# Patient Record
Sex: FEMALE | Race: WHITE | URBAN - NONMETROPOLITAN AREA
[De-identification: names, ages, dates, MRNs, and addresses within clinical notes are randomized per-mention and may not be internally consistent; named-entity substitution may affect disease eponyms.]

---

## 2019-01-16 ENCOUNTER — APPOINTMENT (RX ONLY)
Dept: URBAN - NONMETROPOLITAN AREA CLINIC 7 | Facility: CLINIC | Age: 79
Setting detail: DERMATOLOGY
End: 2019-01-16

## 2019-01-16 DIAGNOSIS — L82.1 OTHER SEBORRHEIC KERATOSIS: ICD-10-CM

## 2019-01-16 DIAGNOSIS — D22 MELANOCYTIC NEVI: ICD-10-CM

## 2019-01-16 DIAGNOSIS — L57.0 ACTINIC KERATOSIS: ICD-10-CM

## 2019-01-16 DIAGNOSIS — L81.4 OTHER MELANIN HYPERPIGMENTATION: ICD-10-CM

## 2019-01-16 DIAGNOSIS — B00.1 HERPESVIRAL VESICULAR DERMATITIS: ICD-10-CM

## 2019-01-16 PROBLEM — D22.5 MELANOCYTIC NEVI OF TRUNK: Status: ACTIVE | Noted: 2019-01-16

## 2019-01-16 PROBLEM — I10 ESSENTIAL (PRIMARY) HYPERTENSION: Status: ACTIVE | Noted: 2019-01-16

## 2019-01-16 PROBLEM — H91.90 UNSPECIFIED HEARING LOSS, UNSPECIFIED EAR: Status: ACTIVE | Noted: 2019-01-16

## 2019-01-16 PROBLEM — Z85.828 PERSONAL HISTORY OF OTHER MALIGNANT NEOPLASM OF SKIN: Status: ACTIVE | Noted: 2019-01-16

## 2019-01-16 PROBLEM — E78.5 HYPERLIPIDEMIA, UNSPECIFIED: Status: ACTIVE | Noted: 2019-01-16

## 2019-01-16 PROBLEM — M12.9 ARTHROPATHY, UNSPECIFIED: Status: ACTIVE | Noted: 2019-01-16

## 2019-01-16 PROCEDURE — ? COUNSELING

## 2019-01-16 PROCEDURE — ? LIQUID NITROGEN

## 2019-01-16 PROCEDURE — ? PRESCRIPTION

## 2019-01-16 PROCEDURE — 17004 DESTROY PREMAL LESIONS 15/>: CPT

## 2019-01-16 PROCEDURE — 99214 OFFICE O/P EST MOD 30 MIN: CPT | Mod: 25

## 2019-01-16 RX ORDER — VALACYCLOVIR HYDROCHLORIDE 1 G/1
TABLET, FILM COATED ORAL
Qty: 21 | Refills: 3 | Status: ERX | COMMUNITY
Start: 2019-01-16

## 2019-01-16 RX ORDER — HYDROQUINONE 4 %
CREAM (GRAM) TOPICAL
Qty: 1 | Refills: 2 | Status: ERX | COMMUNITY
Start: 2019-01-16

## 2019-01-16 RX ORDER — NYSTATIN AND TRIAMCINOLONE ACETONIDE 100000; 1 [USP'U]/G; MG/G
CREAM TOPICAL
Qty: 1 | Refills: 1 | Status: ERX | COMMUNITY
Start: 2019-01-16

## 2019-01-16 RX ADMIN — Medication: at 00:00

## 2019-01-16 RX ADMIN — VALACYCLOVIR HYDROCHLORIDE: 1 TABLET, FILM COATED ORAL at 00:00

## 2019-01-16 RX ADMIN — NYSTATIN AND TRIAMCINOLONE ACETONIDE: 100000; 1 CREAM TOPICAL at 00:00

## 2019-01-16 ASSESSMENT — LOCATION DETAILED DESCRIPTION DERM
LOCATION DETAILED: LEFT MID TEMPLE
LOCATION DETAILED: LEFT PROXIMAL DORSAL FOREARM
LOCATION DETAILED: LEFT POSTERIOR SHOULDER
LOCATION DETAILED: RIGHT MEDIAL SUPERIOR CHEST
LOCATION DETAILED: RIGHT ANTERIOR DISTAL UPPER ARM
LOCATION DETAILED: LEFT ELBOW
LOCATION DETAILED: LEFT ANTERIOR DISTAL UPPER ARM
LOCATION DETAILED: UPPER STERNUM
LOCATION DETAILED: RIGHT ANTERIOR SHOULDER
LOCATION DETAILED: LEFT INFERIOR MEDIAL MIDBACK
LOCATION DETAILED: LEFT PROXIMAL POSTERIOR UPPER ARM
LOCATION DETAILED: LEFT INFERIOR CENTRAL MALAR CHEEK
LOCATION DETAILED: LEFT DISTAL POSTERIOR UPPER ARM
LOCATION DETAILED: RIGHT ANTERIOR PROXIMAL THIGH
LOCATION DETAILED: LEFT CENTRAL MALAR CHEEK
LOCATION DETAILED: LEFT MEDIAL SUPERIOR CHEST
LOCATION DETAILED: LEFT ANTERIOR DISTAL THIGH
LOCATION DETAILED: SUPERIOR THORACIC SPINE
LOCATION DETAILED: LEFT MEDIAL MALAR CHEEK
LOCATION DETAILED: RIGHT INFERIOR CENTRAL MALAR CHEEK
LOCATION DETAILED: LEFT DISTAL DORSAL FOREARM

## 2019-01-16 ASSESSMENT — LOCATION SIMPLE DESCRIPTION DERM
LOCATION SIMPLE: UPPER BACK
LOCATION SIMPLE: LEFT FOREARM
LOCATION SIMPLE: CHEST
LOCATION SIMPLE: RIGHT CHEEK
LOCATION SIMPLE: LEFT SHOULDER
LOCATION SIMPLE: RIGHT THIGH
LOCATION SIMPLE: LEFT POSTERIOR UPPER ARM
LOCATION SIMPLE: LEFT LOWER BACK
LOCATION SIMPLE: LEFT TEMPLE
LOCATION SIMPLE: LEFT ELBOW
LOCATION SIMPLE: LEFT THIGH
LOCATION SIMPLE: LEFT CHEEK
LOCATION SIMPLE: LEFT UPPER ARM
LOCATION SIMPLE: RIGHT SHOULDER
LOCATION SIMPLE: RIGHT UPPER ARM

## 2019-01-16 ASSESSMENT — LOCATION ZONE DERM
LOCATION ZONE: FACE
LOCATION ZONE: LEG
LOCATION ZONE: ARM
LOCATION ZONE: TRUNK

## 2019-01-16 NOTE — HPI: INFECTION (HERPES SIMPLEX)
How Severe Is It?: mild
Is This A New Presentation, Or A Follow-Up?: Follow Up Herpes Simplex
Additional History: Needs refill

## 2020-01-27 ENCOUNTER — APPOINTMENT (RX ONLY)
Dept: URBAN - NONMETROPOLITAN AREA CLINIC 7 | Facility: CLINIC | Age: 80
Setting detail: DERMATOLOGY
End: 2020-01-27

## 2020-01-27 DIAGNOSIS — D18.0 HEMANGIOMA: ICD-10-CM

## 2020-01-27 DIAGNOSIS — L81.4 OTHER MELANIN HYPERPIGMENTATION: ICD-10-CM

## 2020-01-27 DIAGNOSIS — Z85.828 PERSONAL HISTORY OF OTHER MALIGNANT NEOPLASM OF SKIN: ICD-10-CM

## 2020-01-27 DIAGNOSIS — L82.1 OTHER SEBORRHEIC KERATOSIS: ICD-10-CM

## 2020-01-27 DIAGNOSIS — L57.0 ACTINIC KERATOSIS: ICD-10-CM

## 2020-01-27 DIAGNOSIS — D22 MELANOCYTIC NEVI: ICD-10-CM

## 2020-01-27 DIAGNOSIS — B00.1 HERPESVIRAL VESICULAR DERMATITIS: ICD-10-CM

## 2020-01-27 PROBLEM — D48.5 NEOPLASM OF UNCERTAIN BEHAVIOR OF SKIN: Status: ACTIVE | Noted: 2020-01-27

## 2020-01-27 PROBLEM — D18.01 HEMANGIOMA OF SKIN AND SUBCUTANEOUS TISSUE: Status: ACTIVE | Noted: 2020-01-27

## 2020-01-27 PROBLEM — D22.5 MELANOCYTIC NEVI OF TRUNK: Status: ACTIVE | Noted: 2020-01-27

## 2020-01-27 PROCEDURE — ? LIQUID NITROGEN

## 2020-01-27 PROCEDURE — ? BIOPSY BY SHAVE METHOD

## 2020-01-27 PROCEDURE — 17000 DESTRUCT PREMALG LESION: CPT | Mod: 59

## 2020-01-27 PROCEDURE — 17003 DESTRUCT PREMALG LES 2-14: CPT

## 2020-01-27 PROCEDURE — 99213 OFFICE O/P EST LOW 20 MIN: CPT | Mod: 25

## 2020-01-27 PROCEDURE — ? COUNSELING

## 2020-01-27 PROCEDURE — 11102 TANGNTL BX SKIN SINGLE LES: CPT

## 2020-01-27 PROCEDURE — ? PRESCRIPTION

## 2020-01-27 ASSESSMENT — LOCATION SIMPLE DESCRIPTION DERM
LOCATION SIMPLE: NOSE
LOCATION SIMPLE: LEFT UPPER BACK
LOCATION SIMPLE: ABDOMEN
LOCATION SIMPLE: RIGHT BREAST
LOCATION SIMPLE: LEFT CHEEK
LOCATION SIMPLE: RIGHT LOWER BACK

## 2020-01-27 ASSESSMENT — LOCATION DETAILED DESCRIPTION DERM
LOCATION DETAILED: LEFT SUPERIOR MEDIAL MALAR CHEEK
LOCATION DETAILED: EPIGASTRIC SKIN
LOCATION DETAILED: RIGHT MEDIAL BREAST 2-3:00 REGION
LOCATION DETAILED: NASAL ROOT
LOCATION DETAILED: RIGHT SUPERIOR MEDIAL MIDBACK
LOCATION DETAILED: LEFT MID-UPPER BACK

## 2020-01-27 ASSESSMENT — LOCATION ZONE DERM
LOCATION ZONE: TRUNK
LOCATION ZONE: FACE
LOCATION ZONE: NOSE

## 2020-01-27 NOTE — PROCEDURE: BIOPSY BY SHAVE METHOD
Wound Care: Petrolatum
Depth Of Biopsy: dermis
Hide Additional Size Dimension?: No
Notification Instructions: Patient will be notified of biopsy results. However, patient instructed to call the office if not contacted within 2 weeks.
X Size Of Lesion In Cm: 2.5
Electrodesiccation Text: The wound bed was treated with electrodesiccation after the biopsy was performed.
Additional Anesthesia Volume In Cc (Will Not Render If 0): 0
Post-Care Instructions: I reviewed with the patient in detail post-care instructions. Patient is to keep the biopsy site dry overnight, and then apply bacitracin twice daily until healed. Patient may apply hydrogen peroxide soaks to remove any crusting.
Cryotherapy Text: The wound bed was treated with cryotherapy after the biopsy was performed.
Lab Facility: [id number]
Anesthesia Volume In Cc (Will Not Render If 0): 0.5
Type Of Destruction Used: Curettage
Consent: Written consent was obtained and risks were reviewed including but not limited to scarring, infection, bleeding, scabbing, incomplete removal, nerve damage and allergy to anesthesia.
Electrodesiccation And Curettage Text: The wound bed was treated with electrodesiccation and curettage after the biopsy was performed.
Biopsy Type: H and E
Hemostasis: Drysol
Silver Nitrate Text: The wound bed was treated with silver nitrate after the biopsy was performed.
Biopsy Method: Dermablade
Detail Level: Detailed
Dressing: bandage
Was A Bandage Applied: Yes
Size Of Lesion In Cm: 2
Anesthesia Type: 1% lidocaine with epinephrine
Curettage Text: The wound bed was treated with curettage after the biopsy was performed.
Billing Type: Third-Party Bill
Lab: [id number]

## 2020-01-27 NOTE — PROCEDURE: LIQUID NITROGEN
Number Of Freeze-Thaw Cycles: 2 freeze-thaw cycles
Render Post-Care Instructions In Note?: yes
Render Note In Bullet Format When Appropriate: No
Consent: The patient's consent was obtained including but not limited to risks of crusting, scabbing, blistering, scarring, darker or lighter pigmentary change, recurrence, incomplete removal and infection.
Detail Level: Detailed
Duration Of Freeze Thaw-Cycle (Seconds): 0
Post-Care Instructions: I reviewed with the patient in detail post-care instructions. Patient is to wear sunprotection, and avoid picking at any of the treated lesions. Pt may apply Vaseline to crusted or scabbing areas.

## 2020-03-02 ENCOUNTER — APPOINTMENT (RX ONLY)
Dept: URBAN - NONMETROPOLITAN AREA CLINIC 7 | Facility: CLINIC | Age: 80
Setting detail: DERMATOLOGY
End: 2020-03-02

## 2020-03-02 DIAGNOSIS — L57.0 ACTINIC KERATOSIS: ICD-10-CM

## 2020-03-02 PROCEDURE — 17000 DESTRUCT PREMALG LESION: CPT

## 2020-03-02 PROCEDURE — ? PRESCRIPTION

## 2020-03-02 PROCEDURE — ? LIQUID NITROGEN

## 2020-03-02 RX ORDER — MUPIROCIN 20 MG/G
INCH OINTMENT TOPICAL QD
Qty: 1 | Refills: 0 | Status: ERX | COMMUNITY
Start: 2020-03-02

## 2020-03-02 RX ADMIN — MUPIROCIN INCH: 20 OINTMENT TOPICAL at 00:00

## 2020-03-02 ASSESSMENT — LOCATION ZONE DERM: LOCATION ZONE: ARM

## 2020-03-02 ASSESSMENT — LOCATION DETAILED DESCRIPTION DERM: LOCATION DETAILED: LEFT DISTAL DORSAL FOREARM

## 2020-03-02 ASSESSMENT — LOCATION SIMPLE DESCRIPTION DERM: LOCATION SIMPLE: LEFT FOREARM

## 2020-03-02 NOTE — PROCEDURE: LIQUID NITROGEN
Consent: The patient's consent was obtained including but not limited to risks of crusting, scabbing, blistering, scarring, darker or lighter pigmentary change, recurrence, incomplete removal and infection.
Render Post-Care Instructions In Note?: yes
Duration Of Freeze Thaw-Cycle (Seconds): 0
Detail Level: Detailed
Number Of Freeze-Thaw Cycles: 2 freeze-thaw cycles
Post-Care Instructions: I reviewed with the patient in detail post-care instructions. Patient is to wear sunprotection, and avoid picking at any of the treated lesions. Pt may apply Vaseline to crusted or scabbing areas.
Render Note In Bullet Format When Appropriate: No

## 2021-01-28 ENCOUNTER — APPOINTMENT (RX ONLY)
Dept: URBAN - NONMETROPOLITAN AREA CLINIC 7 | Facility: CLINIC | Age: 81
Setting detail: DERMATOLOGY
End: 2021-01-28

## 2021-01-28 DIAGNOSIS — L82.1 OTHER SEBORRHEIC KERATOSIS: ICD-10-CM

## 2021-01-28 DIAGNOSIS — L81.4 OTHER MELANIN HYPERPIGMENTATION: ICD-10-CM

## 2021-01-28 DIAGNOSIS — D22 MELANOCYTIC NEVI: ICD-10-CM

## 2021-01-28 DIAGNOSIS — D18.0 HEMANGIOMA: ICD-10-CM

## 2021-01-28 DIAGNOSIS — B00.1 HERPESVIRAL VESICULAR DERMATITIS: ICD-10-CM | Status: STABLE

## 2021-01-28 DIAGNOSIS — L57.0 ACTINIC KERATOSIS: ICD-10-CM | Status: WORSENING

## 2021-01-28 DIAGNOSIS — Z85.828 PERSONAL HISTORY OF OTHER MALIGNANT NEOPLASM OF SKIN: ICD-10-CM | Status: RESOLVED

## 2021-01-28 PROBLEM — L08.9 LOCAL INFECTION OF THE SKIN AND SUBCUTANEOUS TISSUE, UNSPECIFIED: Status: ACTIVE | Noted: 2021-01-28

## 2021-01-28 PROBLEM — D22.5 MELANOCYTIC NEVI OF TRUNK: Status: ACTIVE | Noted: 2021-01-28

## 2021-01-28 PROBLEM — D18.01 HEMANGIOMA OF SKIN AND SUBCUTANEOUS TISSUE: Status: ACTIVE | Noted: 2021-01-28

## 2021-01-28 PROCEDURE — ? LIQUID NITROGEN

## 2021-01-28 PROCEDURE — ? COUNSELING

## 2021-01-28 PROCEDURE — 17000 DESTRUCT PREMALG LESION: CPT

## 2021-01-28 PROCEDURE — 17003 DESTRUCT PREMALG LES 2-14: CPT

## 2021-01-28 PROCEDURE — 99213 OFFICE O/P EST LOW 20 MIN: CPT | Mod: 25

## 2021-01-28 PROCEDURE — ? TREATMENT REGIMEN

## 2021-01-28 PROCEDURE — ? PRESCRIPTION

## 2021-01-28 RX ORDER — VALACYCLOVIR HYDROCHLORIDE 500 MG/1
TABLET, FILM COATED ORAL BID
Qty: 30 | Refills: 1 | Status: ERX | COMMUNITY
Start: 2021-01-28

## 2021-01-28 RX ADMIN — VALACYCLOVIR HYDROCHLORIDE: 500 TABLET, FILM COATED ORAL at 00:00

## 2021-01-28 ASSESSMENT — LOCATION DETAILED DESCRIPTION DERM
LOCATION DETAILED: LEFT PROXIMAL DORSAL FOREARM
LOCATION DETAILED: LEFT MID-UPPER BACK
LOCATION DETAILED: LEFT INFERIOR TEMPLE
LOCATION DETAILED: EPIGASTRIC SKIN
LOCATION DETAILED: LEFT PROXIMAL POSTERIOR UPPER ARM
LOCATION DETAILED: RIGHT SUPERIOR MEDIAL MIDBACK
LOCATION DETAILED: RIGHT INFERIOR NASAL CHEEK
LOCATION DETAILED: LEFT INFERIOR LATERAL FOREHEAD
LOCATION DETAILED: LEFT CENTRAL MALAR CHEEK
LOCATION DETAILED: RIGHT MEDIAL BREAST 2-3:00 REGION

## 2021-01-28 ASSESSMENT — LOCATION SIMPLE DESCRIPTION DERM
LOCATION SIMPLE: ABDOMEN
LOCATION SIMPLE: LEFT CHEEK
LOCATION SIMPLE: LEFT FOREHEAD
LOCATION SIMPLE: LEFT UPPER BACK
LOCATION SIMPLE: LEFT POSTERIOR UPPER ARM
LOCATION SIMPLE: RIGHT CHEEK
LOCATION SIMPLE: LEFT FOREARM
LOCATION SIMPLE: RIGHT BREAST
LOCATION SIMPLE: RIGHT LOWER BACK
LOCATION SIMPLE: LEFT TEMPLE

## 2021-01-28 ASSESSMENT — LOCATION ZONE DERM
LOCATION ZONE: ARM
LOCATION ZONE: TRUNK
LOCATION ZONE: FACE
LOCATION ZONE: FACE

## 2021-01-28 NOTE — PROCEDURE: LIQUID NITROGEN
Consent: The patient's consent was obtained including but not limited to risks of crusting, scabbing, blistering, scarring, darker or lighter pigmentary change, recurrence, incomplete removal and infection. Done verbally
Duration Of Freeze Thaw-Cycle (Seconds): 0
Render Note In Bullet Format When Appropriate: No
Render Post-Care Instructions In Note?: yes
Post-Care Instructions: I reviewed with the patient in detail post-care instructions. Patient is to wear sunprotection, and avoid picking at any of the treated lesions. Pt may apply Vaseline to crusted or scabbing areas.
Number Of Freeze-Thaw Cycles: 2 freeze-thaw cycles
Detail Level: Detailed

## 2022-04-13 ENCOUNTER — RX ONLY (OUTPATIENT)
Age: 82
Setting detail: RX ONLY
End: 2022-04-13

## 2022-04-13 ENCOUNTER — APPOINTMENT (RX ONLY)
Dept: URBAN - NONMETROPOLITAN AREA CLINIC 7 | Facility: CLINIC | Age: 82
Setting detail: DERMATOLOGY
End: 2022-04-13

## 2022-04-13 DIAGNOSIS — D22 MELANOCYTIC NEVI: ICD-10-CM | Status: UNCHANGED

## 2022-04-13 DIAGNOSIS — L85.3 XEROSIS CUTIS: ICD-10-CM | Status: INADEQUATELY CONTROLLED

## 2022-04-13 DIAGNOSIS — L82.1 OTHER SEBORRHEIC KERATOSIS: ICD-10-CM | Status: STABLE

## 2022-04-13 DIAGNOSIS — L81.4 OTHER MELANIN HYPERPIGMENTATION: ICD-10-CM | Status: STABLE

## 2022-04-13 DIAGNOSIS — D485 NEOPLASM OF UNCERTAIN BEHAVIOR OF SKIN: ICD-10-CM | Status: INADEQUATELY CONTROLLED

## 2022-04-13 DIAGNOSIS — Z85.828 PERSONAL HISTORY OF OTHER MALIGNANT NEOPLASM OF SKIN: ICD-10-CM | Status: RESOLVED

## 2022-04-13 DIAGNOSIS — L57.0 ACTINIC KERATOSIS: ICD-10-CM | Status: INADEQUATELY CONTROLLED

## 2022-04-13 PROBLEM — D48.5 NEOPLASM OF UNCERTAIN BEHAVIOR OF SKIN: Status: ACTIVE | Noted: 2022-04-13

## 2022-04-13 PROBLEM — D22.5 MELANOCYTIC NEVI OF TRUNK: Status: ACTIVE | Noted: 2022-04-13

## 2022-04-13 PROCEDURE — ? BIOPSY BY SHAVE METHOD

## 2022-04-13 PROCEDURE — ? PRESCRIPTION

## 2022-04-13 PROCEDURE — ? COUNSELING

## 2022-04-13 PROCEDURE — 17003 DESTRUCT PREMALG LES 2-14: CPT

## 2022-04-13 PROCEDURE — 17000 DESTRUCT PREMALG LESION: CPT | Mod: 59

## 2022-04-13 PROCEDURE — ? FULL BODY SKIN EXAM

## 2022-04-13 PROCEDURE — 99213 OFFICE O/P EST LOW 20 MIN: CPT | Mod: 25

## 2022-04-13 PROCEDURE — ? LIQUID NITROGEN

## 2022-04-13 PROCEDURE — 11102 TANGNTL BX SKIN SINGLE LES: CPT

## 2022-04-13 PROCEDURE — ? SUNSCREEN RECOMMENDATIONS

## 2022-04-13 PROCEDURE — ? TREATMENT REGIMEN

## 2022-04-13 RX ORDER — AMMONIUM LACTATE 12 G/100G
LOTION TOPICAL BID
Qty: 400 | Refills: 11 | Status: ERX | COMMUNITY
Start: 2022-04-13

## 2022-04-13 RX ORDER — VALACYCLOVIR HYDROCHLORIDE 1 G/1
TABLET, FILM COATED ORAL
Qty: 30 | Refills: 5 | Status: ERX | COMMUNITY
Start: 2022-04-13

## 2022-04-13 RX ADMIN — AMMONIUM LACTATE: 12 LOTION TOPICAL at 00:00

## 2022-04-13 ASSESSMENT — LOCATION SIMPLE DESCRIPTION DERM
LOCATION SIMPLE: RIGHT FOREARM
LOCATION SIMPLE: NOSE
LOCATION SIMPLE: LEFT POSTERIOR UPPER ARM
LOCATION SIMPLE: RIGHT NOSE
LOCATION SIMPLE: RIGHT POSTERIOR UPPER ARM
LOCATION SIMPLE: CHEST
LOCATION SIMPLE: LEFT FOREARM
LOCATION SIMPLE: ABDOMEN
LOCATION SIMPLE: LEFT ANTERIOR NECK
LOCATION SIMPLE: LEFT ELBOW
LOCATION SIMPLE: RIGHT UPPER BACK
LOCATION SIMPLE: LEFT CHEEK

## 2022-04-13 ASSESSMENT — LOCATION ZONE DERM
LOCATION ZONE: ARM
LOCATION ZONE: FACE
LOCATION ZONE: NOSE
LOCATION ZONE: TRUNK
LOCATION ZONE: NECK

## 2022-04-13 ASSESSMENT — LOCATION DETAILED DESCRIPTION DERM
LOCATION DETAILED: NASAL ROOT
LOCATION DETAILED: RIGHT MEDIAL UPPER BACK
LOCATION DETAILED: RIGHT MEDIAL SUPERIOR CHEST
LOCATION DETAILED: RIGHT PROXIMAL DORSAL FOREARM
LOCATION DETAILED: LEFT DISTAL DORSAL FOREARM
LOCATION DETAILED: EPIGASTRIC SKIN
LOCATION DETAILED: LEFT CENTRAL MALAR CHEEK
LOCATION DETAILED: RIGHT NASAL ALA
LOCATION DETAILED: LEFT ELBOW
LOCATION DETAILED: LEFT CLAVICULAR NECK
LOCATION DETAILED: LEFT DISTAL POSTERIOR UPPER ARM
LOCATION DETAILED: RIGHT DISTAL POSTERIOR UPPER ARM
LOCATION DETAILED: RIGHT DISTAL DORSAL FOREARM
LOCATION DETAILED: LEFT LATERAL SUPERIOR CHEST
LOCATION DETAILED: RIGHT SUPERIOR UPPER BACK
LOCATION DETAILED: RIGHT NASAL ALAR GROOVE
LOCATION DETAILED: LEFT SUPERIOR MEDIAL MALAR CHEEK

## 2022-04-13 NOTE — PROCEDURE: LIQUID NITROGEN
Render Note In Bullet Format When Appropriate: No
Show Applicator Variable?: Yes
Number Of Freeze-Thaw Cycles: 1 freeze-thaw cycle
Post-Care Instructions: I reviewed with the patient in detail post-care instructions. Patient is to wear sunprotection, and avoid picking at any of the treated lesions. Pt may apply Vaseline to crusted or scabbing areas.
Duration Of Freeze Thaw-Cycle (Seconds): 0
Detail Level: Simple
Consent: The patient's consent was obtained including but not limited to risks of crusting, scabbing, blistering, scarring, darker or lighter pigmentary change, recurrence, incomplete removal and infection.

## 2022-04-13 NOTE — PROCEDURE: BIOPSY BY SHAVE METHOD
Detail Level: Detailed
Depth Of Biopsy: dermis
Was A Bandage Applied: Yes
Size Of Lesion In Cm: 0.3
X Size Of Lesion In Cm: 0
Biopsy Type: H and E
Biopsy Method: Dermablade
Anesthesia Type: 1% lidocaine with epinephrine
Anesthesia Volume In Cc (Will Not Render If 0): 0.5
Hemostasis: Kamilla's
Wound Care: Bacitracin
Dressing: bandage
Destruction After The Procedure: No
Type Of Destruction Used: Curettage
Curettage Text: The wound bed was treated with curettage after the biopsy was performed.
Cryotherapy Text: The wound bed was treated with cryotherapy after the biopsy was performed.
Electrodesiccation Text: The wound bed was treated with electrodesiccation after the biopsy was performed.
Electrodesiccation And Curettage Text: The wound bed was treated with electrodesiccation and curettage after the biopsy was performed.
Silver Nitrate Text: The wound bed was treated with silver nitrate after the biopsy was performed.
Lab: [id number]
Lab Facility: [id number]
Consent: Written consent was obtained and risks were reviewed including but not limited to scarring, infection, bleeding, scabbing, incomplete removal, nerve damage and allergy to anesthesia.
Post-Care Instructions: I reviewed with the patient in detail post-care instructions. Patient is to keep the biopsy site dry overnight, and then apply bacitracin twice daily until healed. Patient may apply hydrogen peroxide soaks to remove any crusting.
Notification Instructions: Patient will be notified of biopsy results. However, patient instructed to call the office if not contacted within 2 weeks.
Billing Type: Third-Party Bill
Information: Selecting Yes will display possible errors in your note based on the variables you have selected. This validation is only offered as a suggestion for you. PLEASE NOTE THAT THE VALIDATION TEXT WILL BE REMOVED WHEN YOU FINALIZE YOUR NOTE. IF YOU WANT TO FAX A PRELIMINARY NOTE YOU WILL NEED TO TOGGLE THIS TO 'NO' IF YOU DO NOT WANT IT IN YOUR FAXED NOTE.